# Patient Record
Sex: FEMALE | Race: WHITE | HISPANIC OR LATINO
[De-identification: names, ages, dates, MRNs, and addresses within clinical notes are randomized per-mention and may not be internally consistent; named-entity substitution may affect disease eponyms.]

---

## 2022-10-18 PROBLEM — Z00.00 ENCOUNTER FOR PREVENTIVE HEALTH EXAMINATION: Status: ACTIVE | Noted: 2022-10-18

## 2022-10-19 ENCOUNTER — APPOINTMENT (OUTPATIENT)
Dept: PEDIATRIC ORTHOPEDIC SURGERY | Facility: CLINIC | Age: 47
End: 2022-10-19

## 2022-10-19 VITALS — HEIGHT: 55.25 IN | WEIGHT: 98 LBS | BODY MASS INDEX: 22.68 KG/M2 | TEMPERATURE: 98.3 F

## 2022-10-19 DIAGNOSIS — S80.01XA CONTUSION OF RIGHT KNEE, INITIAL ENCOUNTER: ICD-10-CM

## 2022-10-19 DIAGNOSIS — S80.11XA CONTUSION OF RIGHT KNEE, INITIAL ENCOUNTER: ICD-10-CM

## 2022-10-19 DIAGNOSIS — Z86.39 PERSONAL HISTORY OF OTHER ENDOCRINE, NUTRITIONAL AND METABOLIC DISEASE: ICD-10-CM

## 2022-10-19 PROCEDURE — 73562 X-RAY EXAM OF KNEE 3: CPT | Mod: 26

## 2022-10-19 PROCEDURE — 99202 OFFICE O/P NEW SF 15 MIN: CPT

## 2022-10-19 RX ORDER — LEVOTHYROXINE SODIUM 50 UG/1
50 TABLET ORAL
Refills: 0 | Status: ACTIVE | COMMUNITY

## 2022-10-19 RX ORDER — DICLOFENAC SODIUM 75 MG/1
75 TABLET, DELAYED RELEASE ORAL TWICE DAILY
Qty: 60 | Refills: 1 | Status: ACTIVE | COMMUNITY
Start: 2022-10-19 | End: 1900-01-01

## 2022-10-19 NOTE — HISTORY OF PRESENT ILLNESS
[de-identified] : This 47-year-old healthy pleasant lady seen for evaluation of the right knee.  She was well until October 3 when she fell while walking sustaining injury.  She was seen at [facility] x-rays were taken and she was sent home.  She has been progressing and has only minor complaints at this time no locking buckling or sensation of instability.  When she fell she also injured the left knee as well as both wrist however the right knee is more symptomatic.  The other sites have quieted down.  Past history includes hypothyroidism for which she is on medication

## 2022-10-19 NOTE — PHYSICAL EXAM
[de-identified] : Exam today reveals a normal gait.  She has full motion to the right knee with no significant effusion no instability on stress of the cruciate/collateral ligaments.  Mild discomfort on patellofemoral grind.  She has abrasions along the anterior aspect of the knee that are clean and dry and healing nicely.  There is no evidence of infection.  Popliteal fossa calf neurovascular exam are negative.\par \par Review of x-rays of the right knee from [facility] October 3, 2022 are negative

## 2022-10-19 NOTE — ASSESSMENT
[FreeTextEntry1] : Impression: Contusion right knee resolving.\par \par She will continue symptomatic care with diclofenac and return on a as needed basis

## 2024-04-25 ENCOUNTER — APPOINTMENT (OUTPATIENT)
Dept: PEDIATRIC ORTHOPEDIC SURGERY | Facility: CLINIC | Age: 49
End: 2024-04-25
Payer: COMMERCIAL

## 2024-04-25 VITALS
BODY MASS INDEX: 23.14 KG/M2 | SYSTOLIC BLOOD PRESSURE: 111 MMHG | TEMPERATURE: 96.5 F | DIASTOLIC BLOOD PRESSURE: 76 MMHG | WEIGHT: 100 LBS | HEIGHT: 55 IN

## 2024-04-25 DIAGNOSIS — M77.12 LATERAL EPICONDYLITIS, LEFT ELBOW: ICD-10-CM

## 2024-04-25 PROCEDURE — 73080 X-RAY EXAM OF ELBOW: CPT | Mod: LT

## 2024-04-25 PROCEDURE — 20551 NJX 1 TENDON ORIGIN/INSJ: CPT | Mod: LT

## 2024-04-25 PROCEDURE — 99213 OFFICE O/P EST LOW 20 MIN: CPT | Mod: 25

## 2024-04-25 RX ORDER — MELOXICAM 7.5 MG/1
7.5 TABLET ORAL
Qty: 30 | Refills: 0 | Status: ACTIVE | COMMUNITY
Start: 2024-04-25 | End: 1900-01-01

## 2024-04-25 NOTE — PHYSICAL EXAM
[de-identified] : Exam today reveals she has full flexion 5 degree lack to full extension full rotation to the left elbow there is no instability on stressing the collaterals.  She has obvious pain over the lateral epicondyle and extensor wad with pain in this area and dorsiflexion of the wrist against resistance.  Neurovascular status is intact.  X-rays ordered and taken today of the left elbow reveal no significant lesion

## 2024-04-25 NOTE — PROCEDURE
[FreeTextEntry1] :  under sterile technique with a Betadine prep the lateral epicondyle of the left elbow was injected with a 22-gauge needle with 40 mg of methylprednisolone and 2 cc 1% lidocaine with a Band-Aid dressing applied at the conclusion this was tolerated without incident

## 2024-04-25 NOTE — ASSESSMENT
[FreeTextEntry1] : Impression: Lateral epicondylitis left elbow.  I have injected the epicondyle uneventfully she has been placed on Mobic 7.5 mg daily PC with GI precautions.  She will call if she is not progressing

## 2024-04-25 NOTE — HISTORY OF PRESENT ILLNESS
[de-identified] : The this 49-year-old is seen today for evaluation of the left elbow.  She has had a 6-month history of pain along the lateral and posterior aspect without any obvious history of trauma precipitating event.  She has noted mild stiffness as well no numbness paresthesias motor weakness bladder or bowel dysfunction.  She does have difficulty lifting objects at home.  No neck pain.  She has no difficulty with placement of the extremity in space.  Past history includes increased lipids and hypothyroidism.  No allergies

## 2024-07-29 ENCOUNTER — NON-APPOINTMENT (OUTPATIENT)
Age: 49
End: 2024-07-29

## 2024-07-30 ENCOUNTER — APPOINTMENT (OUTPATIENT)
Dept: PEDIATRIC ORTHOPEDIC SURGERY | Facility: CLINIC | Age: 49
End: 2024-07-30
Payer: COMMERCIAL

## 2024-07-30 VITALS
TEMPERATURE: 96.8 F | BODY MASS INDEX: 23.84 KG/M2 | DIASTOLIC BLOOD PRESSURE: 80 MMHG | WEIGHT: 103 LBS | SYSTOLIC BLOOD PRESSURE: 119 MMHG | HEIGHT: 55 IN

## 2024-07-30 DIAGNOSIS — M77.12 LATERAL EPICONDYLITIS, LEFT ELBOW: ICD-10-CM

## 2024-07-30 DIAGNOSIS — M65.9 SYNOVITIS AND TENOSYNOVITIS, UNSPECIFIED: ICD-10-CM

## 2024-07-30 PROCEDURE — 99212 OFFICE O/P EST SF 10 MIN: CPT

## 2024-07-30 RX ORDER — KETOROLAC TROMETHAMINE 10 MG/1
10 TABLET, FILM COATED ORAL 3 TIMES DAILY
Qty: 12 | Refills: 0 | Status: ACTIVE | COMMUNITY
Start: 2024-07-30 | End: 1900-01-01

## 2024-07-30 NOTE — ASSESSMENT
[FreeTextEntry1] : Impression: Lateral epicondylitis/synovitis left elbow.  I had will treat her with a course of Toradol with GI precautions.  I have withheld injection as it is not clear where exactly to inject she will return if she is not progressing

## 2024-07-30 NOTE — PHYSICAL EXAM
[de-identified] : Exam today reveals she has full motion to the elbow in flexion extension as well as rotation.  She has nonspecific discomfort sometimes over the extensor wad sometimes posteriorly I see no obvious clicking no instability on stress no obvious localizing findings.  Neurovascular status is intact the hand and wrist is unremarkable

## 2024-07-30 NOTE — HISTORY OF PRESENT ILLNESS
[de-identified] : This 49-year-old returns she has had recurrent pain to her left elbow not exactly clear exactly where sometimes she points over the lateral epicondyle sometimes posteriorly she has not had any swelling loss of motion clicking or locking to suggest loose body.  No recent precipitating event.  General health remains good